# Patient Record
Sex: MALE | Race: WHITE | NOT HISPANIC OR LATINO | URBAN - METROPOLITAN AREA
[De-identification: names, ages, dates, MRNs, and addresses within clinical notes are randomized per-mention and may not be internally consistent; named-entity substitution may affect disease eponyms.]

---

## 2017-03-13 ENCOUNTER — HISTORICAL (OUTPATIENT)
Dept: ADMINISTRATIVE | Facility: HOSPITAL | Age: 62
End: 2017-03-13

## 2018-04-17 ENCOUNTER — HISTORICAL (OUTPATIENT)
Dept: ADMINISTRATIVE | Facility: HOSPITAL | Age: 63
End: 2018-04-17

## 2018-04-17 LAB
ABS NEUT (OLG): 3
ALBUMIN SERPL-MCNC: 4.3 GM/DL (ref 3.4–5)
ALBUMIN/GLOB SERPL: 1.48 {RATIO} (ref 1.5–2.5)
ALP SERPL-CCNC: 64 UNIT/L (ref 38–126)
ALT SERPL-CCNC: 18 UNIT/L (ref 7–52)
AST SERPL-CCNC: 16 UNIT/L (ref 15–37)
BILIRUB SERPL-MCNC: 0.6 MG/DL (ref 0.2–1)
BILIRUBIN DIRECT+TOT PNL SERPL-MCNC: 0.1 MG/DL (ref 0–0.5)
BUN SERPL-MCNC: 14 MG/DL (ref 7–18)
CALCIUM SERPL-MCNC: 8.9 MG/DL (ref 8.5–10)
CHLORIDE SERPL-SCNC: 103 MMOL/L (ref 98–107)
CHOLEST SERPL-MCNC: 218 MG/DL (ref 0–200)
CHOLEST/HDLC SERPL: 5.7 {RATIO}
CO2 SERPL-SCNC: 28 MMOL/L (ref 21–32)
CREAT SERPL-MCNC: 0.78 MG/DL (ref 0.6–1.3)
CREAT/UREA NIT SERPL: 17.9
ERYTHROCYTE [DISTWIDTH] IN BLOOD BY AUTOMATED COUNT: 12.4 % (ref 11.5–17)
GGT SERPL-CCNC: 29 UNIT/L (ref 5–85)
GLOBULIN SER-MCNC: 2.9 GM/DL (ref 1.2–3)
GLUCOSE SERPL-MCNC: 111 MG/DL (ref 74–106)
HCT VFR BLD AUTO: 44.9 % (ref 42–52)
HDLC SERPL-MCNC: 38 MG/DL (ref 35–60)
HGB BLD-MCNC: 15.9 GM/DL (ref 14–18)
LDH SERPL-CCNC: 135 UNIT/L (ref 140–271)
LDLC SERPL CALC-MCNC: 161 MG/DL (ref 0–129)
LYMPHOCYTES # BLD AUTO: 1.7 X10(3)/MCL (ref 0.6–3.4)
LYMPHOCYTES NFR BLD AUTO: 32.7 % (ref 13–40)
MCH RBC QN AUTO: 32.1 PG (ref 27–31.2)
MCHC RBC AUTO-ENTMCNC: 35 GM/DL (ref 32–36)
MCV RBC AUTO: 91 FL (ref 80–94)
MONOCYTES # BLD AUTO: 0.4 X10(3)/MCL (ref 0–1.8)
MONOCYTES NFR BLD AUTO: 8 % (ref 0.1–24)
NEUTROPHILS NFR BLD AUTO: 59.3 % (ref 47–80)
PLATELET # BLD AUTO: 229 X10(3)/MCL (ref 130–400)
PMV BLD AUTO: 8.9 FL
POTASSIUM SERPL-SCNC: 4.3 MMOL/L (ref 3.5–5.1)
PROT SERPL-MCNC: 7.2 GM/DL (ref 6.4–8.2)
RBC # BLD AUTO: 4.95 X10(6)/MCL (ref 4.7–6.1)
SODIUM SERPL-SCNC: 138 MMOL/L (ref 136–145)
TRIGL SERPL-MCNC: 62 MG/DL (ref 30–150)
TSH SERPL-ACNC: 0.89 MIU/ML (ref 0.35–4.94)
VLDLC SERPL CALC-MCNC: 12.4 MG/DL
WBC # SPEC AUTO: 5.1 X10(3)/MCL (ref 4.5–11.5)

## 2018-04-27 ENCOUNTER — HISTORICAL (OUTPATIENT)
Dept: ADMINISTRATIVE | Facility: HOSPITAL | Age: 63
End: 2018-04-27

## 2019-02-25 ENCOUNTER — HISTORICAL (OUTPATIENT)
Dept: ADMINISTRATIVE | Facility: HOSPITAL | Age: 64
End: 2019-02-25

## 2019-02-25 LAB
ABS NEUT (OLG): 2.7 X10(3)/MCL (ref 2.1–9.2)
ALBUMIN SERPL-MCNC: 4.3 GM/DL (ref 3.4–5)
ALBUMIN/GLOB SERPL: 1.43 {RATIO} (ref 1.5–2.5)
ALP SERPL-CCNC: 83 UNIT/L (ref 38–126)
ALT SERPL-CCNC: 22 UNIT/L (ref 7–52)
AST SERPL-CCNC: 17 UNIT/L (ref 15–37)
BILIRUB SERPL-MCNC: 0.4 MG/DL (ref 0.2–1)
BILIRUBIN DIRECT+TOT PNL SERPL-MCNC: 0.1 MG/DL (ref 0–0.5)
BILIRUBIN DIRECT+TOT PNL SERPL-MCNC: 0.3 MG/DL
BUN SERPL-MCNC: 10 MG/DL (ref 7–18)
CALCIUM SERPL-MCNC: 8.7 MG/DL (ref 8.5–10)
CHLORIDE SERPL-SCNC: 101 MMOL/L (ref 98–107)
CO2 SERPL-SCNC: 27 MMOL/L (ref 21–32)
CREAT SERPL-MCNC: 0.93 MG/DL (ref 0.6–1.3)
ERYTHROCYTE [DISTWIDTH] IN BLOOD BY AUTOMATED COUNT: 11.6 % (ref 11.5–17)
GLOBULIN SER-MCNC: 3 GM/DL (ref 1.2–3)
GLUCOSE SERPL-MCNC: 89 MG/DL (ref 74–106)
HCT VFR BLD AUTO: 44.3 % (ref 42–52)
HGB BLD-MCNC: 14.8 GM/DL (ref 14–18)
LYMPHOCYTES # BLD AUTO: 2.2 X10(3)/MCL (ref 0.6–3.4)
LYMPHOCYTES NFR BLD AUTO: 38.3 % (ref 13–40)
MCH RBC QN AUTO: 30.7 PG (ref 27–31.2)
MCHC RBC AUTO-ENTMCNC: 33 GM/DL (ref 32–36)
MCV RBC AUTO: 92 FL (ref 80–94)
MONOCYTES # BLD AUTO: 0.8 X10(3)/MCL (ref 0.1–1.3)
MONOCYTES NFR BLD AUTO: 14.1 % (ref 0.1–24)
NEUTROPHILS NFR BLD AUTO: 47.6 % (ref 47–80)
PLATELET # BLD AUTO: 218 X10(3)/MCL (ref 130–400)
PMV BLD AUTO: 8.3 FL (ref 9.4–12.4)
POTASSIUM SERPL-SCNC: 3.9 MMOL/L (ref 3.5–5.1)
PROT SERPL-MCNC: 7.3 GM/DL (ref 6.4–8.2)
RBC # BLD AUTO: 4.82 X10(6)/MCL (ref 4.7–6.1)
SODIUM SERPL-SCNC: 138 MMOL/L (ref 136–145)
TSH SERPL-ACNC: 1 MIU/ML (ref 0.35–4.94)
WBC # SPEC AUTO: 5.7 X10(3)/MCL (ref 4.5–11.5)

## 2019-05-14 ENCOUNTER — HISTORICAL (OUTPATIENT)
Dept: ADMINISTRATIVE | Facility: HOSPITAL | Age: 64
End: 2019-05-14

## 2019-05-14 ENCOUNTER — HISTORICAL (OUTPATIENT)
Dept: LAB | Facility: HOSPITAL | Age: 64
End: 2019-05-14

## 2019-05-14 LAB
ABS NEUT (OLG): 2.6 X10(3)/MCL (ref 2.1–9.2)
ALBUMIN SERPL-MCNC: 4.3 GM/DL (ref 3.4–5)
ALBUMIN/GLOB SERPL: 1.43 {RATIO} (ref 1.5–2.5)
ALP SERPL-CCNC: 67 UNIT/L (ref 38–126)
ALT SERPL-CCNC: 24 UNIT/L (ref 7–52)
AST SERPL-CCNC: 22 UNIT/L (ref 15–37)
BILIRUB SERPL-MCNC: 0.5 MG/DL (ref 0.2–1)
BILIRUBIN DIRECT+TOT PNL SERPL-MCNC: 0.1 MG/DL (ref 0–0.5)
BILIRUBIN DIRECT+TOT PNL SERPL-MCNC: 0.4 MG/DL
BUN SERPL-MCNC: 9 MG/DL (ref 7–18)
CALCIUM SERPL-MCNC: 9.1 MG/DL (ref 8.5–10)
CHLORIDE SERPL-SCNC: 103 MMOL/L (ref 98–107)
CO2 SERPL-SCNC: 28 MMOL/L (ref 21–32)
CREAT SERPL-MCNC: 0.78 MG/DL (ref 0.6–1.3)
ERYTHROCYTE [DISTWIDTH] IN BLOOD BY AUTOMATED COUNT: 12.3 % (ref 11.5–17)
GLOBULIN SER-MCNC: 3 GM/DL (ref 1.2–3)
GLUCOSE SERPL-MCNC: 92 MG/DL (ref 74–106)
HCT VFR BLD AUTO: 42.8 % (ref 42–52)
HGB BLD-MCNC: 15.2 GM/DL (ref 14–18)
LIPASE SERPL-CCNC: 196 UNIT/L (ref 73–393)
LYMPHOCYTES # BLD AUTO: 2.2 X10(3)/MCL (ref 0.6–3.4)
LYMPHOCYTES NFR BLD AUTO: 40.9 % (ref 13–40)
MCH RBC QN AUTO: 31 PG (ref 27–31.2)
MCHC RBC AUTO-ENTMCNC: 36 GM/DL (ref 32–36)
MCV RBC AUTO: 87 FL (ref 80–94)
MONOCYTES # BLD AUTO: 0.5 X10(3)/MCL (ref 0.1–1.3)
MONOCYTES NFR BLD AUTO: 9 % (ref 0.1–24)
NEUTROPHILS NFR BLD AUTO: 50.1 % (ref 47–80)
PLATELET # BLD AUTO: 257 X10(3)/MCL (ref 130–400)
PMV BLD AUTO: 8.5 FL (ref 9.4–12.4)
POTASSIUM SERPL-SCNC: 4.2 MMOL/L (ref 3.5–5.1)
PROT SERPL-MCNC: 7.3 GM/DL (ref 6.4–8.2)
PSA SERPL-MCNC: 0.46 NG/ML (ref 0–4.5)
RBC # BLD AUTO: 4.91 X10(6)/MCL (ref 4.7–6.1)
SODIUM SERPL-SCNC: 137 MMOL/L (ref 136–145)
TSH SERPL-ACNC: 1.73 MIU/ML (ref 0.35–4.94)
WBC # SPEC AUTO: 5.3 X10(3)/MCL (ref 4.5–11.5)

## 2020-05-28 ENCOUNTER — HISTORICAL (OUTPATIENT)
Dept: ADMINISTRATIVE | Facility: HOSPITAL | Age: 65
End: 2020-05-28

## 2020-05-28 LAB
ABS NEUT (OLG): 3.3 X10(3)/MCL (ref 2.1–9.2)
ALBUMIN SERPL-MCNC: 4.2 GM/DL (ref 3.4–5)
ALBUMIN/GLOB SERPL: 1.4 {RATIO} (ref 1.5–2.5)
ALP SERPL-CCNC: 74 UNIT/L (ref 38–126)
ALT SERPL-CCNC: 18 UNIT/L (ref 7–52)
AST SERPL-CCNC: 17 UNIT/L (ref 15–37)
BILIRUB SERPL-MCNC: 0.6 MG/DL (ref 0.2–1)
BILIRUBIN DIRECT+TOT PNL SERPL-MCNC: 0.1 MG/DL (ref 0–0.5)
BILIRUBIN DIRECT+TOT PNL SERPL-MCNC: 0.5 MG/DL
BUN SERPL-MCNC: 12 MG/DL (ref 7–18)
CALCIUM SERPL-MCNC: 9.3 MG/DL (ref 8.5–10)
CHLORIDE SERPL-SCNC: 102 MMOL/L (ref 98–107)
CHOLEST SERPL-MCNC: 240 MG/DL (ref 0–200)
CHOLEST/HDLC SERPL: 6.5 {RATIO}
CO2 SERPL-SCNC: 30 MMOL/L (ref 21–32)
CREAT SERPL-MCNC: 0.83 MG/DL (ref 0.6–1.3)
ERYTHROCYTE [DISTWIDTH] IN BLOOD BY AUTOMATED COUNT: 12 % (ref 11.5–17)
GLOBULIN SER-MCNC: 3 GM/DL (ref 1.2–3)
GLUCOSE SERPL-MCNC: 96 MG/DL (ref 74–106)
HCT VFR BLD AUTO: 44.6 % (ref 42–52)
HDLC SERPL-MCNC: 37 MG/DL (ref 35–60)
HGB BLD-MCNC: 15.5 GM/DL (ref 14–18)
LDLC SERPL CALC-MCNC: 191 MG/DL (ref 0–129)
LYMPHOCYTES # BLD AUTO: 2.4 X10(3)/MCL (ref 0.6–3.4)
LYMPHOCYTES NFR BLD AUTO: 38.2 % (ref 13–40)
MCH RBC QN AUTO: 30.2 PG (ref 27–31.2)
MCHC RBC AUTO-ENTMCNC: 35 GM/DL (ref 32–36)
MCV RBC AUTO: 87 FL (ref 80–94)
MONOCYTES # BLD AUTO: 0.7 X10(3)/MCL (ref 0.1–1.3)
MONOCYTES NFR BLD AUTO: 11.2 % (ref 0.1–24)
NEUTROPHILS NFR BLD AUTO: 50.6 % (ref 47–80)
PLATELET # BLD AUTO: 257 X10(3)/MCL (ref 130–400)
PMV BLD AUTO: 8.4 FL (ref 9.4–12.4)
POTASSIUM SERPL-SCNC: 4.5 MMOL/L (ref 3.5–5.1)
PROT SERPL-MCNC: 7.2 GM/DL (ref 6.4–8.2)
PSA SERPL-MCNC: 0.49 NG/ML (ref 0–4.5)
RBC # BLD AUTO: 5.14 X10(6)/MCL (ref 4.7–6.1)
SODIUM SERPL-SCNC: 137 MMOL/L (ref 136–145)
T3FREE SERPL-MCNC: 3.22 PG/ML (ref 1.45–3.48)
T4 FREE SERPL-MCNC: 1.01 NG/DL (ref 0.76–1.46)
TRIGL SERPL-MCNC: 131 MG/DL (ref 30–150)
TSH SERPL-ACNC: 0.52 MIU/ML (ref 0.35–4.94)
VLDLC SERPL CALC-MCNC: 26.2 MG/DL
WBC # SPEC AUTO: 6.4 X10(3)/MCL (ref 4.5–11.5)

## 2020-09-04 ENCOUNTER — HISTORICAL (OUTPATIENT)
Dept: ADMINISTRATIVE | Facility: HOSPITAL | Age: 65
End: 2020-09-04

## 2020-09-04 LAB
ALBUMIN SERPL-MCNC: 4.2 GM/DL (ref 3.4–5)
ALBUMIN/GLOB SERPL: 1.5 {RATIO} (ref 1.5–2.5)
ALP SERPL-CCNC: 75 UNIT/L (ref 38–126)
ALT SERPL-CCNC: 22 UNIT/L (ref 7–52)
AST SERPL-CCNC: 18 UNIT/L (ref 15–37)
BILIRUB SERPL-MCNC: 0.5 MG/DL (ref 0.2–1)
BILIRUBIN DIRECT+TOT PNL SERPL-MCNC: 0.1 MG/DL (ref 0–0.5)
BILIRUBIN DIRECT+TOT PNL SERPL-MCNC: 0.4 MG/DL
BUN SERPL-MCNC: 15 MG/DL (ref 7–18)
CALCIUM SERPL-MCNC: 8.9 MG/DL (ref 8.5–10.1)
CHLORIDE SERPL-SCNC: 102 MMOL/L (ref 98–107)
CHOLEST SERPL-MCNC: 160 MG/DL (ref 0–200)
CHOLEST/HDLC SERPL: 3.6 {RATIO}
CO2 SERPL-SCNC: 27 MMOL/L (ref 21–32)
CREAT SERPL-MCNC: 0.84 MG/DL (ref 0.6–1.3)
GLOBULIN SER-MCNC: 2.8 GM/DL (ref 1.2–3)
GLUCOSE SERPL-MCNC: 90 MG/DL (ref 74–106)
HDLC SERPL-MCNC: 45 MG/DL (ref 35–60)
LDLC SERPL CALC-MCNC: 120 MG/DL (ref 0–129)
POTASSIUM SERPL-SCNC: 4 MMOL/L (ref 3.5–5.1)
PROT SERPL-MCNC: 7 GM/DL (ref 6.4–8.2)
SODIUM SERPL-SCNC: 137 MMOL/L (ref 136–145)
TRIGL SERPL-MCNC: 99 MG/DL (ref 30–150)
VLDLC SERPL CALC-MCNC: 19.8 MG/DL

## 2021-06-01 ENCOUNTER — HISTORICAL (OUTPATIENT)
Dept: ADMINISTRATIVE | Facility: HOSPITAL | Age: 66
End: 2021-06-01

## 2021-06-01 LAB
ABS NEUT (OLG): 3.3 X10(3)/MCL (ref 2.1–9.2)
ALBUMIN SERPL-MCNC: 4.3 GM/DL (ref 3.4–5)
ALBUMIN/GLOB SERPL: 1.54 {RATIO} (ref 1.5–2.5)
ALP SERPL-CCNC: 99 UNIT/L (ref 38–126)
ALT SERPL-CCNC: 18 UNIT/L (ref 7–52)
AST SERPL-CCNC: 20 UNIT/L (ref 15–37)
BILIRUB SERPL-MCNC: 0.7 MG/DL (ref 0.2–1)
BILIRUBIN DIRECT+TOT PNL SERPL-MCNC: 0.1 MG/DL (ref 0–0.5)
BILIRUBIN DIRECT+TOT PNL SERPL-MCNC: 0.6 MG/DL
BUN SERPL-MCNC: 10 MG/DL (ref 7–18)
CALCIUM SERPL-MCNC: 9 MG/DL (ref 8.5–10.1)
CHLORIDE SERPL-SCNC: 102 MMOL/L (ref 98–107)
CHOLEST SERPL-MCNC: 156 MG/DL (ref 0–200)
CHOLEST/HDLC SERPL: 3.8 {RATIO}
CO2 SERPL-SCNC: 25 MMOL/L (ref 21–32)
CREAT SERPL-MCNC: 0.74 MG/DL (ref 0.6–1.3)
ERYTHROCYTE [DISTWIDTH] IN BLOOD BY AUTOMATED COUNT: 12.5 % (ref 11.5–17)
GLOBULIN SER-MCNC: 2.8 GM/DL (ref 1.2–3)
GLUCOSE SERPL-MCNC: 110 MG/DL (ref 74–106)
HCT VFR BLD AUTO: 43.2 % (ref 42–52)
HDLC SERPL-MCNC: 41 MG/DL (ref 35–60)
HGB BLD-MCNC: 14.7 GM/DL (ref 14–18)
LDLC SERPL CALC-MCNC: 112 MG/DL (ref 0–129)
LYMPHOCYTES # BLD AUTO: 1.9 X10(3)/MCL (ref 0.6–3.4)
LYMPHOCYTES NFR BLD AUTO: 33.5 % (ref 13–40)
MCH RBC QN AUTO: 28.9 PG (ref 27–31.2)
MCHC RBC AUTO-ENTMCNC: 34 GM/DL (ref 32–36)
MCV RBC AUTO: 85 FL (ref 80–94)
MONOCYTES # BLD AUTO: 0.5 X10(3)/MCL (ref 0.1–1.3)
MONOCYTES NFR BLD AUTO: 9.5 % (ref 0.1–24)
NEUTROPHILS NFR BLD AUTO: 57 % (ref 47–80)
PLATELET # BLD AUTO: 273 X10(3)/MCL (ref 130–400)
PMV BLD AUTO: 8.1 FL (ref 9.4–12.4)
POTASSIUM SERPL-SCNC: 4.2 MMOL/L (ref 3.5–5.1)
PROT SERPL-MCNC: 7.1 GM/DL (ref 6.4–8.2)
PSA SERPL-MCNC: 0.84 NG/ML (ref 0–4.5)
RBC # BLD AUTO: 5.08 X10(6)/MCL (ref 4.7–6.1)
SODIUM SERPL-SCNC: 135 MMOL/L (ref 136–145)
TRIGL SERPL-MCNC: 103 MG/DL (ref 30–150)
TSH SERPL-ACNC: 0.33 MIU/ML (ref 0.35–4.94)
VLDLC SERPL CALC-MCNC: 20.6 MG/DL
WBC # SPEC AUTO: 5.7 X10(3)/MCL (ref 4.5–11.5)

## 2022-04-10 ENCOUNTER — HISTORICAL (OUTPATIENT)
Dept: ADMINISTRATIVE | Facility: HOSPITAL | Age: 67
End: 2022-04-10

## 2022-04-11 ENCOUNTER — HISTORICAL (OUTPATIENT)
Dept: ADMINISTRATIVE | Facility: HOSPITAL | Age: 67
End: 2022-04-11

## 2022-04-28 VITALS
BODY MASS INDEX: 30.37 KG/M2 | HEIGHT: 68 IN | SYSTOLIC BLOOD PRESSURE: 132 MMHG | WEIGHT: 200.38 LBS | DIASTOLIC BLOOD PRESSURE: 82 MMHG

## 2022-04-28 VITALS
HEIGHT: 68 IN | BODY MASS INDEX: 30.37 KG/M2 | DIASTOLIC BLOOD PRESSURE: 82 MMHG | SYSTOLIC BLOOD PRESSURE: 132 MMHG | WEIGHT: 200.38 LBS

## 2022-04-30 NOTE — OP NOTE
Patient:   Adeel Link Jr             MRN: [id number]            FIN: [id number]               Age:   62 years     Sex:  Male     :  1955   Associated Diagnoses:   None   Author:   Roberto Carlos Jcakson MD      Preoperative Diagnosis: Cataract Left eye    Postoperative Diagnosis: Cataract Left eye    Procedure: Phacoemulsification with intaocular lens implantations Left eye    Surgeon: Roberto Carlos Jackson MD    Assistant: Evelyn Paulino, [facility]    Anestheisa: Topical    Complications: None    The patient was brought into the operating suite, where the patient was correctly identified as was the operative eye via timeout.  The patient was prepped and draped in a sterile ophthalmic fashion.  A lid speculum was placed in the operative eye and the microscope was brought into place.  A 1.0mm paracentesis was then made at (3) o'clock.  The anterior chamber was filled with Endocoat.  A (superior) clear corneal incision was made with a 2.4 mm keratome.  A 6 mm corneal marking ring was used to jonh the cornea centered over the visual axis.  A 5.00 mm continuous curvilinear capsulorhexis was fashioned using a cystotome and microcapsular forceps.  Hydrodissection and hydrodelineation was performed with upreserved 1% Xylocaine.  The nucleus was then phacoemulsified with the Abbott phacoemulsification hand-piece with a total of (2) EFX.  The cortex was then removed with the Romel I/A hand-piece. An OLGA lens model (ZCB00) with a power of (19.5) was placed in the capsular bag.  The Helon was then removed from the eye with the Romel I/A hand piece. The anterior chamber was inflated and the wounds were hydrated with BSS.  The wounds were checked with Weck-Jessa sponges and found to be watertight.  The lid speculum was removed and topical antibiotics were placed on the operative eye.  The patient was brought to PACU in good condition.      Surgery Date 18 [facility]

## 2022-05-03 NOTE — HISTORICAL [facility] CERNER
This is a historical note converted from [employer]. Formatting and pictures may have been removed.  Please reference [place] for original formatting and attached multimedia. Chief Complaint  3 MTH RC HLD FAST  History of Present Illness  64 year old WM male presents for 3 month recheck  PMH: Hypothyroidism  PSH: Knee Scope (Bilateral), Left Eye cataract surgery  ?   Social: , 2 kids, Retired   No Tob, EtOH: 1-2 beer per week, Coffee: 2 cups per day, 2 Cokes per day  Exercise: Wts 3-4x/week,  Diet: typical cajun diet  ?   Started on Atorvastatin 20mg once daily 5/2020. Hasnt changed diet but is tolerating med and taking consistently  Review of Systems  Constitutional:?no fever, fatigue, weakness  Eye:?no vision loss, eye redness, drainage, or pain  ENMT:?no sore throat, ear pain, sinus pain/congestion  Respiratory:?no cough, no wheezing, no shortness of breath  Cardiovascular:?no chest pain, no palpitations, no edema  Gastrointestinal:?no nausea, vomiting, or diarrhea. No abdominal pain  Genitourinary:?no dysuria, no urinary frequency or urgency, no hematuria  Hema/Lymph:?no abnormal bruising or bleeding  Endocrine:?no heat or cold intolerance, no excessive thirst or excessive urination  Musculoskeletal:?no muscle or joint pain, no joint swelling  Integumentary:?no skin rash or abnormal lesion  Neurologic: no headache, no dizziness, no weakness or numbness  Physical Exam  Vitals & Measurements  T:?36.8? ?C (Oral)? HR:?60(Peripheral)? BP:?140/76?  HT:?172?cm? HT:?172.00?cm? WT:?93?kg? WT:?93.000?kg? BMI:?31.44?  General:?well-developed well-nourished in no acute distress  Eye: PERRLA, EOMI, clear conjunctiva, eyelids normal  HENT:?TMs/ear canals clear, oropharynx without erythema/exudate, oropharynx and nasal mucosal surfaces moist, no maxillary/frontal sinus tenderness to palpation  Neck: full range of motion, no thyromegaly or lymphadenopathy  Respiratory:?clear to auscultation  bilaterally  Cardiovascular:?regular rate and rhythm without murmurs, gallops or rubs  Gastrointestinal:?soft, non-tender, non-distended with normal bowel sounds, without masses to palpation  Genitourinary: no CVA tenderness to palpation  Musculoskeletal:?full range of motion of all extremities/spine without limitation or discomfort  Integumentary: no rashes or skin lesions present  Neurologic: cranial nerves intact, no signs of peripheral neurological deficit, motor/sensory function intact  Assessment/Plan  1.?Depression?F32.9  Continue Lexapro 20mg PO q day  Ordered:  Office/Outpatient Visit Level 3 Established 08056 PC, Hyperlipidemia  Depression  Hypothyroid, [facility] - AFP, 09/04/20 8:47:00 CDT  ?  2.?Hyperlipidemia?E78.5  ?Continue Atorvastatin 20mg PO q day  ?Repeat FLP and CMP today  Ordered:  Comprehensive Metabolic Panel, Now collect, 09/04/20 8:48:00 CDT, Blood, Order for future visit, Stop date 09/04/20 8:48:00 CDT, Lab Collect, Hyperlipidemia, 09/04/20 8:48:00 CDT  Lab Collection Request, 09/04/20 8:48:00 CDT, [facility] - AFP, 09/04/20 8:48:00 CDT, Hyperlipidemia  Lipid Panel, Routine collect, 09/04/20 8:48:00 CDT, Blood, Order for future visit, Stop date 09/04/20 8:48:00 CDT, Lab Collect, Hyperlipidemia, 09/04/20 8:48:00 CDT  Office/Outpatient Visit Level 3 Established 22831 PC, Hyperlipidemia  Depression  Hypothyroid, [facility] - AFP, 09/04/20 8:47:00 CDT  ?  3.?Hypothyroid?E03.9  Continue Levothyroxine 137mcg PO q day  Ordered:  Office/Outpatient Visit Level 3 Established 62670 PC, Hyperlipidemia  Depression  Hypothyroid, [facility] - AFP, 09/04/20 8:47:00 CDT  ?   Problem List/Past Medical History  Ongoing  Anxiety  Cataract  DDD (degenerative disc disease), lumbar  Depression  Hyperlipidemia  Hypothyroid  OA (osteoarthritis) of knee  Historical  Graves disease  Procedure/Surgical History  73888 - LT CATARACT W/IOL 1 STA PHACO (Left) (04/27/2018)  Extracapsular cataract removal with insertion  of intraocular lens prosthesis (1 stage procedure), manual or mechanical technique (eg, irrigation and aspiration or phacoemulsification) (04/27/2018)  Replacement of Left Lens with Synthetic Substitute, Percutaneous Approach (04/27/2018)  knee surgery x4   Medications  atorvastatin 20 mg oral tablet, See Instructions  escitalopram 20 mg oral tablet, See Instructions  levothyroxine 137 mcg (0.137 mg) oral tablet, See Instructions  XIIDRA KALPESH 5%, 1 drop(s), OPTH, BID  Allergies  No Known Allergies  Social History  Abuse/Neglect  No, 09/04/2020  Alcohol - Denies Alcohol Use, 02/06/2014  Current, 1-2 times per year, 05/28/2020  Employment/School  Employed, 04/17/2018  Exercise  Exercise duration: 20. Exercise frequency: 3-4 times/week. Exercise type: Aerobics, Weight lifting., 04/17/2018  Home/Environment  Lives with Children, Spouse., 04/17/2018  Nutrition/Health  Regular, 04/17/2018  Substance Use - Denies Substance Abuse, 02/06/2014  Never, 04/17/2018  Tobacco - Denies Tobacco Use, 02/06/2014  Never (less than 100 in lifetime), N/A, 09/04/2020  Family History  Acute myocardial infarction.: Brother.  Alzheimers disease: Sister.  Breast cancer: Sister.  CAD - Coronary artery disease: Mother.  Cancer: Brother.  Hypothyroidism: Sister and Brother.  Immunizations  Vaccine Date Status   pneumococcal 23-polyvalent vaccine 05/28/2020 Given   pneumococcal 13-valent conjugate vaccine 05/14/2019 Given   tetanus/diphtheria/pertussis, acel(Tdap) 05/14/2019 Given   Health Maintenance  Health Maintenance  ???Pending?(in the next year)  ??? ??OverDue  ??? ? ? ?Pneumococcal Vaccine due??and every?  ??? ? ? ?Advance Directive due??01/02/20??and every 1??year(s)  ??? ? ? ?Alcohol Misuse Screening due??01/02/20??and every 1??year(s)  ??? ? ? ?Cognitive Screening due??01/02/20??and every 1??year(s)  ??? ? ? ?Fall Risk Assessment due??01/02/20??and every 1??year(s)  ??? ? ? ?Functional Assessment due??01/02/20??and every 1??year(s)  ???  ? ? ?ADL Screening due??05/14/20??and every 1??year(s)  ??? ??Due?  ??? ? ? ?Aspirin Therapy for CVD Prevention due??09/04/20??and every 1??year(s)  ??? ? ? ?Colorectal Screening due??09/04/20??and every?  ??? ? ? ?IPPE due??09/04/20??One-time only  ??? ? ? ?Influenza Vaccine due??09/04/20??and every?  ??? ? ? ?Medicare Annual Wellness Exam due??09/04/20??and every 1??year(s)  ??? ? ? ?Zoster Vaccine due??09/04/20??and every?  ??? ??Due In Future?  ??? ? ? ?Obesity Screening not due until??01/01/21??and every 1??year(s)  ???Satisfied?(in the past 1 year)  ??? ??Satisfied?  ??? ? ? ?Blood Pressure Screening on??09/04/20.??Satisfied by Pia Patel LPN  ??? ? ? ?Body Mass Index Check on??09/04/20.??Satisfied by Pia Patel LPN  ??? ? ? ?Depression Screening on??09/04/20.??Satisfied by Pia Patel LPN  ??? ? ? ?Diabetes Screening on??05/28/20.??Satisfied by Chelle Guevara  ??? ? ? ?Lipid Screening on??05/28/20.??Satisfied by Chelle Guevara  ??? ? ? ?Obesity Screening on??09/04/20.??Satisfied by Pia Patel LPN  ?      Patient condition discussed?in detail with nurse practitioner.? Agree with plan of care?and follow-up.

## 2022-06-06 PROBLEM — M17.9 OSTEOARTHRITIS OF KNEE: Status: ACTIVE | Noted: 2022-06-06

## 2022-06-06 PROBLEM — H26.9 CATARACT: Status: ACTIVE | Noted: 2022-06-06

## 2022-06-06 PROBLEM — F32.A DEPRESSIVE DISORDER: Status: ACTIVE | Noted: 2022-06-06

## 2022-06-06 PROBLEM — M51.36 DEGENERATION OF INTERVERTEBRAL DISC OF LUMBAR REGION: Status: ACTIVE | Noted: 2022-06-06

## 2022-06-06 PROBLEM — F41.1 GENERALIZED ANXIETY DISORDER: Status: ACTIVE | Noted: 2022-06-06

## 2022-06-06 PROBLEM — E78.5 HYPERLIPIDEMIA: Status: ACTIVE | Noted: 2022-06-06

## 2022-06-06 PROBLEM — E03.9 HYPOTHYROIDISM: Status: ACTIVE | Noted: 2022-06-06

## 2023-06-08 PROBLEM — Z00.00 MEDICARE ANNUAL WELLNESS VISIT, SUBSEQUENT: Status: ACTIVE | Noted: 2023-06-08

## 2023-06-08 PROBLEM — E66.9 OBESITY (BMI 30-39.9): Status: ACTIVE | Noted: 2023-06-08

## 2023-06-08 PROBLEM — F41.9 ANXIETY AND DEPRESSION: Status: ACTIVE | Noted: 2022-06-06

## 2023-09-11 PROBLEM — Z00.00 MEDICARE ANNUAL WELLNESS VISIT, SUBSEQUENT: Status: RESOLVED | Noted: 2023-06-08 | Resolved: 2023-09-11
